# Patient Record
Sex: MALE | Race: WHITE | NOT HISPANIC OR LATINO | URBAN - METROPOLITAN AREA
[De-identification: names, ages, dates, MRNs, and addresses within clinical notes are randomized per-mention and may not be internally consistent; named-entity substitution may affect disease eponyms.]

---

## 2017-02-20 ENCOUNTER — OFFICE VISIT - HEALTHEAST (OUTPATIENT)
Dept: INTERNAL MEDICINE | Facility: CLINIC | Age: 27
End: 2017-02-20

## 2017-02-20 DIAGNOSIS — Z00.00 HEALTHCARE MAINTENANCE: ICD-10-CM

## 2017-02-20 ASSESSMENT — MIFFLIN-ST. JEOR: SCORE: 1936.02

## 2017-03-08 ENCOUNTER — COMMUNICATION - HEALTHEAST (OUTPATIENT)
Dept: INTERNAL MEDICINE | Facility: CLINIC | Age: 27
End: 2017-03-08

## 2017-03-08 ENCOUNTER — OFFICE VISIT - HEALTHEAST (OUTPATIENT)
Dept: INTERNAL MEDICINE | Facility: CLINIC | Age: 27
End: 2017-03-08

## 2017-03-08 DIAGNOSIS — R61 NIGHT SWEATS: ICD-10-CM

## 2017-03-08 DIAGNOSIS — G93.31 POSTVIRAL FATIGUE SYNDROME: ICD-10-CM

## 2017-03-08 ASSESSMENT — MIFFLIN-ST. JEOR: SCORE: 1917.87

## 2017-03-09 ENCOUNTER — COMMUNICATION - HEALTHEAST (OUTPATIENT)
Dept: INTERNAL MEDICINE | Facility: CLINIC | Age: 27
End: 2017-03-09

## 2017-05-01 ENCOUNTER — RECORDS - HEALTHEAST (OUTPATIENT)
Dept: ADMINISTRATIVE | Facility: OTHER | Age: 27
End: 2017-05-01

## 2017-06-13 ENCOUNTER — COMMUNICATION - HEALTHEAST (OUTPATIENT)
Dept: INTERNAL MEDICINE | Facility: CLINIC | Age: 27
End: 2017-06-13

## 2017-07-10 ENCOUNTER — COMMUNICATION - HEALTHEAST (OUTPATIENT)
Dept: INTERNAL MEDICINE | Facility: CLINIC | Age: 27
End: 2017-07-10

## 2017-07-12 ENCOUNTER — OFFICE VISIT - HEALTHEAST (OUTPATIENT)
Dept: INTERNAL MEDICINE | Facility: CLINIC | Age: 27
End: 2017-07-12

## 2017-07-12 ENCOUNTER — COMMUNICATION - HEALTHEAST (OUTPATIENT)
Dept: INTERNAL MEDICINE | Facility: CLINIC | Age: 27
End: 2017-07-12

## 2017-07-12 DIAGNOSIS — Z87.39 HX OF OSGOOD-SCHLATTER DISEASE: ICD-10-CM

## 2017-07-12 DIAGNOSIS — Z00.00 HEALTHCARE MAINTENANCE: ICD-10-CM

## 2017-07-12 ASSESSMENT — MIFFLIN-ST. JEOR: SCORE: 1908.8

## 2017-07-31 ENCOUNTER — RECORDS - HEALTHEAST (OUTPATIENT)
Dept: ADMINISTRATIVE | Facility: OTHER | Age: 27
End: 2017-07-31

## 2017-10-16 ENCOUNTER — COMMUNICATION - HEALTHEAST (OUTPATIENT)
Dept: INTERNAL MEDICINE | Facility: CLINIC | Age: 27
End: 2017-10-16

## 2018-04-22 ENCOUNTER — COMMUNICATION - HEALTHEAST (OUTPATIENT)
Dept: INTERNAL MEDICINE | Facility: CLINIC | Age: 28
End: 2018-04-22

## 2021-05-27 ENCOUNTER — RECORDS - HEALTHEAST (OUTPATIENT)
Dept: ADMINISTRATIVE | Facility: CLINIC | Age: 31
End: 2021-05-27

## 2021-05-28 ENCOUNTER — RECORDS - HEALTHEAST (OUTPATIENT)
Dept: ADMINISTRATIVE | Facility: CLINIC | Age: 31
End: 2021-05-28

## 2021-05-29 ENCOUNTER — RECORDS - HEALTHEAST (OUTPATIENT)
Dept: ADMINISTRATIVE | Facility: CLINIC | Age: 31
End: 2021-05-29

## 2021-05-30 VITALS — HEIGHT: 74 IN | WEIGHT: 198 LBS | BODY MASS INDEX: 25.41 KG/M2

## 2021-05-30 VITALS — BODY MASS INDEX: 25.93 KG/M2 | WEIGHT: 202 LBS | HEIGHT: 74 IN

## 2021-05-31 VITALS — WEIGHT: 196 LBS | BODY MASS INDEX: 25.15 KG/M2 | HEIGHT: 74 IN

## 2021-06-09 NOTE — PROGRESS NOTES
Office Visit - Follow Up   Geoffrey Ridley   26 y.o. male    Date of Visit: 3/8/2017    Chief Complaint   Patient presents with     Fatigue     x 1.5 months. Sxs: fatigue and on/off temp, night sweats, chest congestion. No cough. Feels fine during the day.        Assessment and Plan   1. Postviral fatigue syndrome  Likely due to some post viral fatigue.  Reassurance given.  Labs today for further evaluation and will do chest x-rays well.  He notes Some vague chest congestion.  Trial of 3 days of low-dose prednisone and some albuterol to see if this improves his symptoms with exertion or not.  Call if things are not improving or worsening.  - HM2(CBC w/o Differential)  - Comprehensive Metabolic Panel  - XR Chest PA and Lateral  - Thyroid Stimulating Hormone (TSH)    2. Night sweats  As above.        Return if symptoms worsen or fail to improve.     History of Present Illness   This 26 y.o. old healthy young gentleman who actually just past his [employer] physical fitness test with a very demanding course comes in today with a month or more of just not having the energy that he feels like he should.  He had a cold and ever since then he just hasn't rebounded.  He just doesn't feel like he has the energy he used to and gets more short of breath.  He does a lot of activity though and is able again to run quite fast etc.  He also had some night sweats 2 nights ago which concerns him.  He wants to be just make sure he's feeling okay and is going to the mountains this weekend and would like to be able to have more energy for race he is doing.  It's a ski race.     Review of Systems: A comprehensive review of systems was negative except as noted.     Medications, Allergies and Problem List   Reviewed and updated     Physical Exam   General Appearance:   Somewhat pale gentleman but appears well.  O2 sats noted.  Lungs are clear heart is regular he looks very healthy.    Visit Vitals     /80 (Patient Site: Right Arm,  "Patient Position: Sitting, Cuff Size: Adult Large)     Pulse 68     Temp 97.7  F (36.5  C)     Ht 6' 2\" (1.88 m)     Wt 198 lb (89.8 kg)     SpO2 99%     BMI 25.42 kg/m2            Additional Information   Current Outpatient Prescriptions   Medication Sig Dispense Refill     albuterol (PROVENTIL HFA;VENTOLIN HFA) 90 mcg/actuation inhaler Inhale 2 puffs every 6 (six) hours as needed for shortness of breath. 1 Inhaler 2     predniSONE (DELTASONE) 20 MG tablet Take 1 tablet (20 mg total) by mouth daily for 10 days. 10 tablet 0     No current facility-administered medications for this visit.      Allergies   Allergen Reactions     Grass Pollen Itching     Social History   Substance Use Topics     Smoking status: Never Smoker     Smokeless tobacco: Never Used     Alcohol use Not on file       Review and/or order of clinical lab tests:  Review and/or order of radiology tests:  Review and/or order of medicine tests:  Discussion of test results with performing physician:  Decision to obtain old records and/or obtain history from someone other than the patient:  Review and summarization of old records and/or obtaining history from someone other than the patient and.or discussion of case with another health care provider:  Independent visualization of image, tracing or specimen itself:    Time: total time spent with the patient was 15 minutes of which >50% was spent in counseling and coordination of care     Torin Juarez MD  "

## 2021-06-09 NOTE — PROGRESS NOTES
"  Office Visit - Follow Up   Geoffrey Ridley   26 y.o. male    Date of Visit: 2/20/2017    Chief Complaint   Patient presents with     Paperwork     Forms for physical fit test - scheduled for March 7th        Assessment and Plan   1. Healthcare maintenance  No contraindication for doing the physical fitness testing required of the [employer].  I wished him best of luck.  Return when necessary.  Paperwork completed.        No Follow-up on file.     History of Present Illness   This 26 y.o. old pleasant healthy young gentleman with no significant past medical history comes in today because he needs some forms filled out for the [employer].  He is currently in the application process to enter the [employer].  He needs to have a distal fitness test done in the next couple weeks.  He needs a statement by myself stating that he is fit enough to participate.  He is been very athletic his whole life.  He works out quite a bit.  He's done the testing on his own and has done quite well.  He feels good.     Review of Systems: A comprehensive review of systems was negative except as noted.     Medications, Allergies and Problem List   Reviewed and updated     Physical Exam   General Appearance:   This is a pleasant thin healthy-appearing young gentleman in no distress.    Visit Vitals     /68 (Patient Site: Right Arm, Patient Position: Sitting, Cuff Size: Adult Large)     Pulse (!) 54     Ht 6' 2\" (1.88 m)     Wt 202 lb (91.6 kg)     SpO2 100%     BMI 25.94 kg/m2            Additional Information   No current outpatient prescriptions on file.     No current facility-administered medications for this visit.      Allergies   Allergen Reactions     Grass Pollen Itching     Social History   Substance Use Topics     Smoking status: Never Smoker     Smokeless tobacco: Never Used     Alcohol use None       Review and/or order of clinical lab tests:  Review and/or order of radiology tests:  Review and/or order of medicine " tests:  Discussion of test results with performing physician:  Decision to obtain old records and/or obtain history from someone other than the patient:  Review and summarization of old records and/or obtaining history from someone other than the patient and.or discussion of case with another health care provider:  Independent visualization of image, tracing or specimen itself:    Time: total time spent with the patient was 15 minutes of which >50% was spent in counseling and coordination of care     Torin Juarez MD

## 2021-06-11 NOTE — PROGRESS NOTES
"  Office Visit - Follow Up   Geoffrey Ridley   26 y.o. male    Date of Visit: 7/12/2017    Chief Complaint   Patient presents with     Immunizations     discuss requirements for [employer]         Assessment and Plan   1. Healthcare maintenance  We will try to track down his immunization records but I did give him prescriptions for the polio and meningococcal vaccines if we cannot find those records.  We do have documentation of his Tdap and his MMR which we gave to him today.    2. Hx of Osgood-Schlatter disease  No residual issues and no concern for employment with the [place] of investigation.        Return for Next scheduled follow up.     History of Present Illness   This 26 y.o. old healthy young gentleman comes in today for some clarification for a possible job with the [employer].  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  He needs some additional clarification on his health history.  He needs immunization records.  It is unclear if he has had or when he has had his last polio or meningococcal vaccines.  Were trying to clarify that for him with his pediatrician.  He also has a remote history of palpitations when he just gotten out of college and is starting a job.  He had a workup at that time which included Holter monitor which was normal and EKG which is normal.  He needs clarification of that for them.  He also has remote history of Osgood-Schlatter which is not been an issue since childhood.  He is very active and has no limiting musculoskeletal issues.  He is feeling well at this time.    Review of Systems: A comprehensive review of systems was negative except as noted.     Medications, Allergies and Problem List   Reviewed and updated     Physical Exam   General Appearance:   Pleasant gentleman.  Looks well.    /82 (Patient Site: Right Arm, Patient Position: Sitting, Cuff Size: Adult Regular)  Pulse 62  Ht 6' 2\" (1.88 m)  Wt 196 lb (88.9 kg)  SpO2 100%  BMI 25.16 kg/m2         Additional Information   No " current outpatient prescriptions on file.     No current facility-administered medications for this visit.      Allergies   Allergen Reactions     Grass Pollen Itching     Social History   Substance Use Topics     Smoking status: Never Smoker     Smokeless tobacco: Never Used     Alcohol use None       Review and/or order of clinical lab tests:  Review and/or order of radiology tests:  Review and/or order of medicine tests:  Discussion of test results with performing physician:  Decision to obtain old records and/or obtain history from someone other than the patient:  Review and summarization of old records and/or obtaining history from someone other than the patient and.or discussion of case with another health care provider:  Independent visualization of image, tracing or specimen itself:    Time: total time spent with the patient was 15 minutes of which >50% was spent in counseling and coordination of care     Torin Juarez MD